# Patient Record
Sex: MALE | Race: WHITE | NOT HISPANIC OR LATINO | URBAN - NONMETROPOLITAN AREA
[De-identification: names, ages, dates, MRNs, and addresses within clinical notes are randomized per-mention and may not be internally consistent; named-entity substitution may affect disease eponyms.]

---

## 2024-08-21 ENCOUNTER — APPOINTMENT (OUTPATIENT)
Dept: PODIATRY | Facility: CLINIC | Age: 84
End: 2024-08-21
Payer: OTHER GOVERNMENT

## 2024-08-21 DIAGNOSIS — M79.671 FOOT PAIN, BILATERAL: ICD-10-CM

## 2024-08-21 DIAGNOSIS — M79.672 FOOT PAIN, BILATERAL: ICD-10-CM

## 2024-08-21 DIAGNOSIS — R60.1 GENERALIZED EDEMA: ICD-10-CM

## 2024-08-21 DIAGNOSIS — B35.1 ONYCHOMYCOSIS: Primary | ICD-10-CM

## 2024-08-21 PROCEDURE — 1157F ADVNC CARE PLAN IN RCRD: CPT | Performed by: PODIATRIST

## 2024-08-21 PROCEDURE — 1160F RVW MEDS BY RX/DR IN RCRD: CPT | Performed by: PODIATRIST

## 2024-08-21 PROCEDURE — 99213 OFFICE O/P EST LOW 20 MIN: CPT | Performed by: PODIATRIST

## 2024-08-21 PROCEDURE — 1159F MED LIST DOCD IN RCRD: CPT | Performed by: PODIATRIST

## 2024-08-21 NOTE — PROGRESS NOTES
This is a 83 y.o. male est patient for Dm foot exam    History of Present Illness:   This 83 year old male presents to clinic today for foot concerns. Patient denies being a diabetic but is unable to safely trim nails on own. Patient states they are tender in shoe gear. Denies trauma to area, no other pedal complaints at this time.  Wife is unable to trim nails. States they are too thick       Past Medical History  History reviewed. No pertinent past medical history.    Medications and Allergies have been reviewed.    Review Of Systems:  GENERAL: No weight loss, malaise or fevers.  HEENT: Negative for frequent or significant headaches,   RESPIRATORY: Negative for cough, wheezing or shortness of breath.  CARDIOVASCULAR: Negative for chest pain, leg swelling or palpitations.    Physical Exam:  Patient is a pleasant, cooperative, well developed 83 y.o.  adult male. The patient is alert and oriented to time, place and person.   Patient has normal affect and mood.    Examination of Both Lower Extremities:   Vascular exam: Dorsalis pedis and Posterior Tibial pulses palpable 1/4 bilateral. Capillary refill time less than 3 seconds b/l. No Hair growth noted to digits. No edema noted. Skin temp warm to warm from proximal to distal b/l. + varicosities noted.     Neurologic exam: Gross sensation present b/l. No neuro deficits noted b/l      Musculoskeletal exam: Muscle strength 5/5 for all major muscle groups tested in the lower extremity b/l. No gross deformities noted b/l.      Dermatologic exam: Nails 1-5 b/l are thickened, elongated and discolored. Rodolfo horn appearance. Webspaces 1-4 b/l are clean, dry and intact. No primary or secondary skin lesions noted. No open lesions or wounds noted at this time .  1. Type 2 diabetes mellitus with diabetic neuropathy, without long-term current use of insulin (Multi)        2. Onychomycosis        3. Generalized edema        4. Foot pain, bilateral            Patient examined and  evaluated.   Patient educated on proper foot care.  Nails 1-5 b/l were debrided in thickness and length with nail cutting forceps.  OK to trim and file  HPK to right distal hallux debrided  Fu prn  A1C 5.5  Patient was in agreement to this plan. All questions answered.      Donna Morataya DPM  [phone]  Option 2  Fax: [phone]